# Patient Record
Sex: MALE | Race: WHITE
[De-identification: names, ages, dates, MRNs, and addresses within clinical notes are randomized per-mention and may not be internally consistent; named-entity substitution may affect disease eponyms.]

---

## 2018-05-04 ENCOUNTER — HOSPITAL ENCOUNTER (OUTPATIENT)
Dept: HOSPITAL 92 - SDC | Age: 23
Discharge: HOME | End: 2018-05-04
Attending: SURGERY
Payer: COMMERCIAL

## 2018-05-04 DIAGNOSIS — L72.8: Primary | ICD-10-CM

## 2018-05-04 DIAGNOSIS — K42.9: ICD-10-CM

## 2018-05-04 DIAGNOSIS — Z98.890: ICD-10-CM

## 2018-05-04 PROCEDURE — 87205 SMEAR GRAM STAIN: CPT

## 2018-05-04 PROCEDURE — 87070 CULTURE OTHR SPECIMN AEROBIC: CPT

## 2018-05-04 PROCEDURE — 88304 TISSUE EXAM BY PATHOLOGIST: CPT

## 2018-05-04 PROCEDURE — 0WQF0ZZ REPAIR ABDOMINAL WALL, OPEN APPROACH: ICD-10-PCS | Performed by: SURGERY

## 2018-05-04 NOTE — HP
PROCEDURE:  Operation today.

 

OUTPATIENT HISTORY:  Mr. Ricardo Alvarez is a 22-year-old male, [employer] student, lives in an apartment with
 roommates from [facility].  He has had 5 years ago an umbilical infection, required antibiotics and
 now presents again with the same.  He was seen at [facility], had a CAT scan and labs,
 which we are waiting on.  He has been started on clindamycin.  He was seen today, had liquids this m
orning.  Otherwise, n.p.o. except for the water.

 

ALLERGIES:  None.

 

TOBACCO:  None.

 

ALCOHOL:  None.

 

MEDICATIONS:  None.

 

PAST SURGICAL HISTORY:  Bilateral meniscectomies, ear surgery.

 

PAST MEDICAL HISTORY:  Noncontributory.

 

REVIEW OF SYSTEMS:  Ten-point noncontributory.

 

PHYSICAL EXAMINATION:

VITAL SIGNS:  369 pounds, 5 foot 11 inches, 51 BMI, 142/81, 109, and 97 degrees.

HEENT:  Unremarkable.

LUNGS:  Clear to auscultation.

CARDIAC:  Regular rate and rhythm without murmur or gallop.

ABDOMEN:  Obese, soft, nontender.  Supraumbilical area reveals a brownish drainage from a tender mass
-like effect supraumbilical area.  This is tender without overlying redness.  There is about a 3 cm m
ass.

 

ASSESSMENT AND PLAN:  Umbilical cyst abscess versus hernia.  We would plan exploration in the operati
ng room, repair of hernia present, excision of cyst, drainage of infection, risk of infection, bleedi
ng, reoperation explained and he consents.

## 2018-05-04 NOTE — OP
DATE OF PROCEDURE:  05/04/2018

 

PREOPERATIVE DIAGNOSES:  Umbilical cyst/abscess, recurrent infection, umbilical hernia, cyst abscess 
intermittently adherent to the umbilicus with a large defect in the umbilicus.

 

POSTOPERATIVE DIAGNOSES:  Umbilical cyst/abscess, recurrent infection, umbilical hernia, cyst abscess
 intermittently adherent to the umbilicus with a large defect in the umbilicus.

 

PROCEDURE:  Drainage of umbilical abscess with resection of umbilical cyst, resection of the umbilicu
s, closure of umbilical hernia with PDS absorbable sutures.  Wound left open to heal by secondary int
ention.

 

SURGEON:  Alton Galdamez MD

 

ANESTHESIA:  General, local of 0.25% Marcaine with epinephrine 30 mL.

 

DESCRIPTION OF PROCEDURE:  The patient was taken to the operating room, where under general anesthesi
a, abdomen was prepared with ChloraPrep, draped in routine fashion.  Incision was made and drainage o
f the supraumbilical abscess performed.  There was a large umbilical cyst closely adherent to the umb
ilicus with a large defect posteriorly.  Umbilicus was resected using sharp dissection, cautery for h
emostasis.  Cyst was excised from the surrounding tissues and from the deep fascia, where there was a
 small hernia defect approximated with 2 interrupted sutures of 0 PDS pop-offs.  Skin and subcutaneou
s tissues were irrigated.  Local anesthetic infiltrated into the skin and subcutaneous tissue.  Wound
 packed open, healing by secondary intention.  The patient tolerated the procedure well.